# Patient Record
(demographics unavailable — no encounter records)

---

## 2024-10-23 NOTE — DISCUSSION/SUMMARY
[Bundle Branch Block] : ~T bundle branch block [PVCs] : ectopic ventricular beats [Cardiomyopathy] : cardiomyopathy [Improving] : improving [Hypertension] : hypertension [Responding to Treatment] : responding to treatment [Outpatient Evaluation] : outpatient evaluation [Ambulatory BP Monitoring] : ambulatory blood pressure monitoring [Basic Metabolic Panel] : basic metabolic panel [Stable] : stable [Echocardiogram] : an echocardiogram [None] : none [de-identified] : pt takes hydralazine only twice a day, so bp not too low [de-identified] : nst 2024 in chart

## 2024-10-23 NOTE — PHYSICAL EXAM
[General Appearance - Well Developed] : well developed [Normal Appearance] : normal appearance [Well Groomed] : well groomed [General Appearance - Well Nourished] : well nourished [No Deformities] : no deformities [General Appearance - In No Acute Distress] : no acute distress [Normal Conjunctiva] : the conjunctiva exhibited no abnormalities [Eyelids - No Xanthelasma] : the eyelids demonstrated no xanthelasmas [Normal Oral Mucosa] : normal oral mucosa [No Oral Pallor] : no oral pallor [No Oral Cyanosis] : no oral cyanosis [Normal Jugular Venous A Waves Present] : normal jugular venous A waves present [Normal Jugular Venous V Waves Present] : normal jugular venous V waves present [No Jugular Venous [place]te[place] A Waves] : no jugular venous ortega A waves [Respiration, Rhythm And Depth] : normal respiratory rhythm and effort [Exaggerated Use Of Accessory Muscles For Inspiration] : no accessory muscle use [Auscultation Breath Sounds / Voice Sounds] : lungs were clear to auscultation bilaterally [Abdomen Soft] : soft [Abdomen Tenderness] : non-tender [Abdomen Mass (___ Cm)] : no abdominal mass palpated [FreeTextEntry1] : pt cant walk on a treadmill , uses a cail, severe bilateral knee pain/arthritis, pt wobbles side to side when walks [Nail Clubbing] : no clubbing of the fingernails [Cyanosis, Localized] : no localized cyanosis [Petechial Hemorrhages (___cm)] : no petechial hemorrhages [Skin Color & Pigmentation] : normal skin color and pigmentation [] : no rash [No Venous Stasis] : no venous stasis [Skin Lesions] : no skin lesions [No Skin Ulcers] : no skin ulcer [No Xanthoma] : no  xanthoma was observed [Oriented To Time, Place, And Person] : oriented to person, place, and time [Affect] : the affect was normal [Mood] : the mood was normal [No Anxiety] : not feeling anxious [Normal Rate] : normal [Normal S1] : normal S1 [Normal S2] : normal S2 [S3] : no S3 [S4] : no S4 [No Murmur] : no murmurs heard [Right Carotid Bruit] : no bruit heard over the right carotid [Left Carotid Bruit] : no bruit heard over the left carotid [Right Femoral Bruit] : no bruit heard over the right femoral artery [Left Femoral Bruit] : no bruit heard over the left femoral artery [2+] : left 2+ [No Abnormalities] : the abdominal aorta was not enlarged and no bruit was heard [No Pitting Edema] : no pitting edema present

## 2024-10-23 NOTE — REASON FOR VISIT
[Hypertension] : hypertension [FreeTextEntry1] : pt feels fatigue,, gets dyspnea on exertion, ortho issues so doesnt walk well, occasional palps, no new sx

## 2025-04-09 NOTE — DISCUSSION/SUMMARY
[Bundle Branch Block] : ~T bundle branch block [PVCs] : ectopic ventricular beats [Cardiomyopathy] : cardiomyopathy [Improving] : improving [Hypertension] : hypertension [Responding to Treatment] : responding to treatment [Outpatient Evaluation] : outpatient evaluation [Ambulatory BP Monitoring] : ambulatory blood pressure monitoring [Basic Metabolic Panel] : basic metabolic panel [Stable] : stable [Echocardiogram] : an echocardiogram [None] : none [de-identified] : pt takes hydralazine only twice a day, so bp not too low [de-identified] : nst 2024 in chart